# Patient Record
Sex: FEMALE | Race: WHITE | NOT HISPANIC OR LATINO | Employment: OTHER | URBAN - METROPOLITAN AREA
[De-identification: names, ages, dates, MRNs, and addresses within clinical notes are randomized per-mention and may not be internally consistent; named-entity substitution may affect disease eponyms.]

---

## 2023-02-17 ENCOUNTER — HOSPITAL ENCOUNTER (EMERGENCY)
Facility: HOSPITAL | Age: 68
Discharge: HOME | End: 2023-02-17
Attending: STUDENT IN AN ORGANIZED HEALTH CARE EDUCATION/TRAINING PROGRAM | Admitting: STUDENT IN AN ORGANIZED HEALTH CARE EDUCATION/TRAINING PROGRAM
Payer: COMMERCIAL

## 2023-02-17 VITALS
DIASTOLIC BLOOD PRESSURE: 82 MMHG | TEMPERATURE: 99.4 F | RESPIRATION RATE: 20 BRPM | HEART RATE: 95 BPM | WEIGHT: 206 LBS | OXYGEN SATURATION: 99 % | SYSTOLIC BLOOD PRESSURE: 159 MMHG

## 2023-02-17 DIAGNOSIS — H53.9 VISION CHANGES: ICD-10-CM

## 2023-02-17 LAB
ALBUMIN SERPL BCG-MCNC: 4.2 G/DL (ref 3.5–5.2)
ALP SERPL-CCNC: 264 U/L (ref 35–104)
ALT SERPL W P-5'-P-CCNC: 72 U/L (ref 10–35)
ANION GAP SERPL CALCULATED.3IONS-SCNC: 8 MMOL/L (ref 7–15)
AST SERPL W P-5'-P-CCNC: 61 U/L (ref 10–35)
BASOPHILS # BLD AUTO: 0.1 10E3/UL (ref 0–0.2)
BASOPHILS NFR BLD AUTO: 1 %
BILIRUB SERPL-MCNC: 0.4 MG/DL
BUN SERPL-MCNC: 10.4 MG/DL (ref 8–23)
CALCIUM SERPL-MCNC: 10 MG/DL (ref 8.8–10.2)
CHLORIDE SERPL-SCNC: 102 MMOL/L (ref 98–107)
CREAT SERPL-MCNC: 0.68 MG/DL (ref 0.51–0.95)
DEPRECATED HCO3 PLAS-SCNC: 28 MMOL/L (ref 22–29)
EOSINOPHIL # BLD AUTO: 0.1 10E3/UL (ref 0–0.7)
EOSINOPHIL NFR BLD AUTO: 2 %
ERYTHROCYTE [DISTWIDTH] IN BLOOD BY AUTOMATED COUNT: 13.4 % (ref 10–15)
GFR SERPL CREATININE-BSD FRML MDRD: >90 ML/MIN/1.73M2
GLUCOSE SERPL-MCNC: 114 MG/DL (ref 70–99)
HCT VFR BLD AUTO: 45.5 % (ref 35–47)
HGB BLD-MCNC: 14.5 G/DL (ref 11.7–15.7)
IMM GRANULOCYTES # BLD: 0 10E3/UL
IMM GRANULOCYTES NFR BLD: 0 %
LYMPHOCYTES # BLD AUTO: 1.3 10E3/UL (ref 0.8–5.3)
LYMPHOCYTES NFR BLD AUTO: 23 %
MAGNESIUM SERPL-MCNC: 2.4 MG/DL (ref 1.7–2.3)
MCH RBC QN AUTO: 28.7 PG (ref 26.5–33)
MCHC RBC AUTO-ENTMCNC: 31.9 G/DL (ref 31.5–36.5)
MCV RBC AUTO: 90 FL (ref 78–100)
MONOCYTES # BLD AUTO: 0.3 10E3/UL (ref 0–1.3)
MONOCYTES NFR BLD AUTO: 6 %
NEUTROPHILS # BLD AUTO: 3.7 10E3/UL (ref 1.6–8.3)
NEUTROPHILS NFR BLD AUTO: 68 %
NRBC # BLD AUTO: 0 10E3/UL
NRBC BLD AUTO-RTO: 0 /100
PLATELET # BLD AUTO: 247 10E3/UL (ref 150–450)
POTASSIUM SERPL-SCNC: 4.4 MMOL/L (ref 3.4–5.3)
PROT SERPL-MCNC: 7.7 G/DL (ref 6.4–8.3)
RBC # BLD AUTO: 5.06 10E6/UL (ref 3.8–5.2)
SODIUM SERPL-SCNC: 138 MMOL/L (ref 136–145)
TROPONIN T SERPL HS-MCNC: 7 NG/L
WBC # BLD AUTO: 5.4 10E3/UL (ref 4–11)

## 2023-02-17 PROCEDURE — 84484 ASSAY OF TROPONIN QUANT: CPT | Performed by: STUDENT IN AN ORGANIZED HEALTH CARE EDUCATION/TRAINING PROGRAM

## 2023-02-17 PROCEDURE — 93005 ELECTROCARDIOGRAM TRACING: CPT | Performed by: STUDENT IN AN ORGANIZED HEALTH CARE EDUCATION/TRAINING PROGRAM

## 2023-02-17 PROCEDURE — 36415 COLL VENOUS BLD VENIPUNCTURE: CPT | Performed by: STUDENT IN AN ORGANIZED HEALTH CARE EDUCATION/TRAINING PROGRAM

## 2023-02-17 PROCEDURE — 80053 COMPREHEN METABOLIC PANEL: CPT | Performed by: STUDENT IN AN ORGANIZED HEALTH CARE EDUCATION/TRAINING PROGRAM

## 2023-02-17 PROCEDURE — 85025 COMPLETE CBC W/AUTO DIFF WBC: CPT | Performed by: STUDENT IN AN ORGANIZED HEALTH CARE EDUCATION/TRAINING PROGRAM

## 2023-02-17 PROCEDURE — 99284 EMERGENCY DEPT VISIT MOD MDM: CPT | Mod: 25

## 2023-02-17 PROCEDURE — 83735 ASSAY OF MAGNESIUM: CPT | Performed by: STUDENT IN AN ORGANIZED HEALTH CARE EDUCATION/TRAINING PROGRAM

## 2023-02-17 ASSESSMENT — ACTIVITIES OF DAILY LIVING (ADL): ADLS_ACUITY_SCORE: 33

## 2023-02-17 NOTE — ED TRIAGE NOTES
C/o blurred vision to both eyes onset Wednesday afternoon. Patient also has felt lightheaded which is worse when she walks and her chest feels hot. Also concerned about her blood pressure. Denies any new symptoms today. Denies pain.

## 2023-02-17 NOTE — DISCHARGE INSTRUCTIONS
Your blood work today was reassuring.  There were no signs of heart attack.  Your liver enzymes are mildly elevated.  Please call your primary care doctor to get seen in the next week or so.  Return for any worsening symptoms.

## 2023-02-17 NOTE — ED PROVIDER NOTES
Emergency Department Encounter         FINAL IMPRESSION:  Vision changes        ED COURSE AND MEDICAL DECISION MAKING     9:17 AM I met the patient and performed my initial interview and exam.     ED Course as of 02/17/23 1305   Fri Feb 17, 2023   8508 Patient is an obese 68-year-old female here with blurry vision and intermittent weakness for the past couple days.  Patient states the blurry vision and double vision at times is intermittent.  Does not last very long.  There is no exertional component with it.  States at times she has difficulty reading her phone.  States that she did not know whether or not to come here or to the eye doctor today.  Was concerned about possible high blood pressure although she did not check her blood pressure at home.  Has not seen a doctor in years.  Denies any shortness of breath, chest pain, paresthesias, headache, neck pain, difficulty walking, dysarthria, weakness in upper or lower extremities,  dysuria or bowel movement changes.  States this morning she was cleaning when she had again episode of blurry vision and weakness that was quite ambiguous and spontaneously resolved.  No presyncope with these episodes.  No syncopal events.  No diaphoresis or nausea.  No chest pain.  Examination otherwise is normal here.  NIH of 0.  She is asymptomatic regarding her blurry vision stating that her vision is actually normal at this time.  States when she did look down my name tag to read I did at 1 point seem blurry however transient.     -Labs reassuring.  Troponin negative.  Mild elevation of LFTs however patient with no abdominal pain, nausea vomiting or significant symptoms suggesting obstructive pattern.  - EKG: Sinus rhythm 98, no signs of acute ischemia, no inversions no depressions no STEMI, QTc 449, no old EKGs for comparison.                   Medical Decision Making    History:    Supplemental history from: N/A    External Record(s) reviewed: Documented in chart, if  "applicable.    Work Up:    Chart documentation includes differential considered and any EKGs or imaging independently interpreted by provider, where specified.    In additional to work up documented, I considered the following work up: Documented in chart, if applicable.    External consultation:    Discussion of management with another provider: Documented in chart, if applicable    Complicating factors:    Care impacted by chronic illness: N/A    Care affected by social determinants of health: N/A    Disposition considerations: Discharge. No recommendations on prescription strength medication(s). See documentation for any additional details.              At the conclusion of the encounter I discussed the results of all the tests and the disposition. The questions were answered. The patient or family acknowledged understanding and was agreeable with the care plan.                  MEDICATIONS GIVEN IN THE EMERGENCY DEPARTMENT:  Medications - No data to display    NEW PRESCRIPTIONS STARTED AT TODAY'S ED VISIT:  There are no discharge medications for this patient.      HPI     Patient information obtained from: patient     Use of Interpretor: N/A     Lesly Ramirez is a 68 year old female with no pertinent history on file who presents to this ED by walk-in for evaluation of vision problem.    The patient reports intermittent blurred vision since Wednesday 2/15. States it comes on independent of her activity and resolves spontaneously quickly after coming on. She describes it as a \"fuzziness\" and patient also notes some occasional double vision. She has noticed the blurred vision mainly when she tries to focus on something for a while. She says she is having trouble seeing the numbers on her phone. Patient also notes some general weakness which resolves when she sits down/rests and intermittent sensation of warmth in her chest. She had a cold recently which is improving. The patient otherwise denies any headaches, " "numbness, tingling, lightheadedness, focal weakness, speech issues, appetite change, abdominal pain, diaphoresis, bowel/bladder changes, nausea, vomiting, difficulty swallowing, fever, cough, or shortness of breath. She expresses concern about her BP being high. The patient notes she has not seen a doctor in several years, though does have a PCP and an eye doctor. Patient denies additional medical concerns or complaints at this time.          REVIEW OF SYSTEMS:  Review of Systems   Constitutional: Negative for fever, malaise. Positive for general weakness.  HEENT: Negative runny nose, sore throat, ear pain, neck pain. Positive for blurred and double vision (intermittent).  Respiratory: Negative for shortness of breath, cough, congestion  Cardiovascular: Negative for chest pain, leg edema. Positive for chest \"warmth\" (intermittent).  Gastrointestinal: Negative for abdominal distention, abdominal pain, constipation, vomiting, nausea, diarrhea  Genitourinary: Negative for dysuria and hematuria.   Integument: Negative for rash, skin breakdown  Neurological: Negative for paresthesias, focal weakness, headache.  Musculoskeletal: Negative for joint pain, joint swelling      All other systems reviewed and are negative.          MEDICAL HISTORY     History reviewed. No pertinent past medical history.    History reviewed. No pertinent surgical history.         No current outpatient medications on file.          PHYSICAL EXAM     BP (!) 159/82   Pulse 95   Temp 99.4  F (37.4  C) (Temporal)   Resp 20   Wt 93.4 kg (206 lb)   SpO2 99%       PHYSICAL EXAM:     General: Patient appears well, nontoxic, comfortable  HEENT: Moist mucous membranes,  No head trauma.  No midline neck pain. She is asymptomatic regarding her blurry vision stating that her vision is actually normal at this time.  States when she did look down my name tag to read I did at 1 point seem blurry however transient.  Cardiovascular: Normal rate, normal " rhythm, no extremity edema.  No appreciable murmur.  Respiratory: No signs of respiratory distress, lungs are clear to auscultation bilaterally with no wheezes rhonchi or rales.  Abdominal: Soft, nontender, nondistended, no palpable masses, no guarding, no rebound  Musculoskeletal: Full range of motion of joints, no deformities appreciated.  Neurological: Alert and oriented, grossly neurologically intact. NIH 0.     Psychological: Normal affect and mood.  Integument: No rashes appreciated      RESULTS       Labs Ordered and Resulted from Time of ED Arrival to Time of ED Departure   COMPREHENSIVE METABOLIC PANEL - Abnormal       Result Value    Sodium 138      Potassium 4.4      Chloride 102      Carbon Dioxide (CO2) 28      Anion Gap 8      Urea Nitrogen 10.4      Creatinine 0.68      Calcium 10.0      Glucose 114 (*)     Alkaline Phosphatase 264 (*)     AST 61 (*)     ALT 72 (*)     Protein Total 7.7      Albumin 4.2      Bilirubin Total 0.4      GFR Estimate >90     MAGNESIUM - Abnormal    Magnesium 2.4 (*)    TROPONIN T, HIGH SENSITIVITY - Normal    Troponin T, High Sensitivity 7     CBC WITH PLATELETS AND DIFFERENTIAL    WBC Count 5.4      RBC Count 5.06      Hemoglobin 14.5      Hematocrit 45.5      MCV 90      MCH 28.7      MCHC 31.9      RDW 13.4      Platelet Count 247      % Neutrophils 68      % Lymphocytes 23      % Monocytes 6      % Eosinophils 2      % Basophils 1      % Immature Granulocytes 0      NRBCs per 100 WBC 0      Absolute Neutrophils 3.7      Absolute Lymphocytes 1.3      Absolute Monocytes 0.3      Absolute Eosinophils 0.1      Absolute Basophils 0.1      Absolute Immature Granulocytes 0.0      Absolute NRBCs 0.0         No orders to display                     PROCEDURES:  Procedures:  Procedures       Kayley CASE am serving as a scribe to document services personally performed by Reuben Mcpherson DO, based on my observations and the provider's statements to me.  IReuben DO, attest  that Kayley Rodriguez is acting in a scribe capacity, has observed my performance of the services and has documented them in accordance with my direction.    Reuben Mcpherson DO  Emergency Medicine  [facility] EMERGENCY DEPARTMENT     Ohl, Reuben Aragon DO  02/17/23 3151

## 2023-02-25 ENCOUNTER — NURSE TRIAGE (OUTPATIENT)
Dept: NURSING | Facility: CLINIC | Age: 68
End: 2023-02-25
Payer: COMMERCIAL

## 2023-02-25 ENCOUNTER — APPOINTMENT (OUTPATIENT)
Dept: CT IMAGING | Facility: HOSPITAL | Age: 68
End: 2023-02-25
Attending: EMERGENCY MEDICINE
Payer: COMMERCIAL

## 2023-02-25 ENCOUNTER — HOSPITAL ENCOUNTER (EMERGENCY)
Facility: HOSPITAL | Age: 68
Discharge: HOME | End: 2023-02-25
Attending: EMERGENCY MEDICINE | Admitting: EMERGENCY MEDICINE
Payer: COMMERCIAL

## 2023-02-25 VITALS
TEMPERATURE: 97.9 F | WEIGHT: 200 LBS | BODY MASS INDEX: 32.14 KG/M2 | SYSTOLIC BLOOD PRESSURE: 159 MMHG | RESPIRATION RATE: 16 BRPM | HEART RATE: 106 BPM | DIASTOLIC BLOOD PRESSURE: 72 MMHG | OXYGEN SATURATION: 98 % | HEIGHT: 66 IN

## 2023-02-25 DIAGNOSIS — R03.0 ELEVATED BLOOD PRESSURE READING WITHOUT DIAGNOSIS OF HYPERTENSION: ICD-10-CM

## 2023-02-25 DIAGNOSIS — R79.89 ELEVATED LFTS: ICD-10-CM

## 2023-02-25 LAB
ALBUMIN SERPL BCG-MCNC: 4.2 G/DL (ref 3.5–5.2)
ALP SERPL-CCNC: 276 U/L (ref 35–104)
ALT SERPL W P-5'-P-CCNC: 79 U/L (ref 10–35)
ANION GAP SERPL CALCULATED.3IONS-SCNC: 12 MMOL/L (ref 7–15)
AST SERPL W P-5'-P-CCNC: 67 U/L (ref 10–35)
BASOPHILS # BLD AUTO: 0.1 10E3/UL (ref 0–0.2)
BASOPHILS NFR BLD AUTO: 1 %
BILIRUB SERPL-MCNC: 0.4 MG/DL
BUN SERPL-MCNC: 11 MG/DL (ref 8–23)
CALCIUM SERPL-MCNC: 10 MG/DL (ref 8.8–10.2)
CHLORIDE SERPL-SCNC: 102 MMOL/L (ref 98–107)
CREAT SERPL-MCNC: 0.75 MG/DL (ref 0.51–0.95)
DEPRECATED HCO3 PLAS-SCNC: 26 MMOL/L (ref 22–29)
EOSINOPHIL # BLD AUTO: 0.1 10E3/UL (ref 0–0.7)
EOSINOPHIL NFR BLD AUTO: 2 %
ERYTHROCYTE [DISTWIDTH] IN BLOOD BY AUTOMATED COUNT: 13.1 % (ref 10–15)
GFR SERPL CREATININE-BSD FRML MDRD: 87 ML/MIN/1.73M2
GLUCOSE SERPL-MCNC: 152 MG/DL (ref 70–99)
HCT VFR BLD AUTO: 45.6 % (ref 35–47)
HGB BLD-MCNC: 15 G/DL (ref 11.7–15.7)
IMM GRANULOCYTES # BLD: 0 10E3/UL
IMM GRANULOCYTES NFR BLD: 0 %
LYMPHOCYTES # BLD AUTO: 1.1 10E3/UL (ref 0.8–5.3)
LYMPHOCYTES NFR BLD AUTO: 17 %
MCH RBC QN AUTO: 28.7 PG (ref 26.5–33)
MCHC RBC AUTO-ENTMCNC: 32.9 G/DL (ref 31.5–36.5)
MCV RBC AUTO: 87 FL (ref 78–100)
MONOCYTES # BLD AUTO: 0.5 10E3/UL (ref 0–1.3)
MONOCYTES NFR BLD AUTO: 7 %
NEUTROPHILS # BLD AUTO: 4.7 10E3/UL (ref 1.6–8.3)
NEUTROPHILS NFR BLD AUTO: 73 %
NRBC # BLD AUTO: 0 10E3/UL
NRBC BLD AUTO-RTO: 0 /100
PLATELET # BLD AUTO: 292 10E3/UL (ref 150–450)
POTASSIUM SERPL-SCNC: 3.9 MMOL/L (ref 3.4–5.3)
PROT SERPL-MCNC: 7.5 G/DL (ref 6.4–8.3)
RBC # BLD AUTO: 5.23 10E6/UL (ref 3.8–5.2)
SODIUM SERPL-SCNC: 140 MMOL/L (ref 136–145)
TSH SERPL DL<=0.005 MIU/L-ACNC: 1.38 UIU/ML (ref 0.3–4.2)
WBC # BLD AUTO: 6.4 10E3/UL (ref 4–11)

## 2023-02-25 PROCEDURE — 85025 COMPLETE CBC W/AUTO DIFF WBC: CPT | Performed by: EMERGENCY MEDICINE

## 2023-02-25 PROCEDURE — 70450 CT HEAD/BRAIN W/O DYE: CPT

## 2023-02-25 PROCEDURE — 80053 COMPREHEN METABOLIC PANEL: CPT | Performed by: EMERGENCY MEDICINE

## 2023-02-25 PROCEDURE — 84443 ASSAY THYROID STIM HORMONE: CPT | Performed by: EMERGENCY MEDICINE

## 2023-02-25 PROCEDURE — 36415 COLL VENOUS BLD VENIPUNCTURE: CPT | Performed by: EMERGENCY MEDICINE

## 2023-02-25 PROCEDURE — 93005 ELECTROCARDIOGRAM TRACING: CPT | Performed by: EMERGENCY MEDICINE

## 2023-02-25 PROCEDURE — 99285 EMERGENCY DEPT VISIT HI MDM: CPT | Mod: 25

## 2023-02-25 RX ORDER — LISINOPRIL 5 MG/1
5 TABLET ORAL DAILY
Qty: 30 TABLET | Refills: 0 | Status: SHIPPED | OUTPATIENT
Start: 2023-02-25

## 2023-02-25 ASSESSMENT — ENCOUNTER SYMPTOMS
COUGH: 0
SHORTNESS OF BREATH: 0
NAUSEA: 0
FATIGUE: 0
CHILLS: 0
FEVER: 0
VOMITING: 0
DYSURIA: 0
LIGHT-HEADEDNESS: 1
HEADACHES: 0
WEAKNESS: 0
ABDOMINAL PAIN: 0

## 2023-02-25 ASSESSMENT — ACTIVITIES OF DAILY LIVING (ADL): ADLS_ACUITY_SCORE: 35

## 2023-02-25 NOTE — ED PROVIDER NOTES
EMERGENCY DEPARTMENT ENCOUNTER      NAME: Lesly Ramirez  AGE: 68 year old female  YOB: 1955  MRN: [id number]  EVALUATION DATE & TIME: 2/25/2023 10:29 AM    PCP: No Ref-Primary, Physician    ED PROVIDER: Marcie Mcpherson DO      Chief Complaint   Patient presents with     Hypertension     Fatigue         FINAL IMPRESSION:  1. Elevated blood pressure reading without diagnosis of hypertension    2. Elevated LFTs          ED COURSE & MEDICAL DECISION MAKING:    Pertinent Labs & Imaging studies reviewed. (See chart for details)  10:52 AM I met the patient and performed my initial interview and exam.  11:57 AM I rechecked and updated the patient. Patient ready for discharge.     68 year old female presents to the Emergency Department for evaluation of fatigue, lightheadedness, elevated blood pressure.  Patient notes a couple weeks of intermittent blurred vision.  Seen here around a week ago, testing unremarkable.  See by her eye doctor and thought to be weakness of her extraocular muscles and referred to a specialist.  Checking BP at home and in the 180s today.  Always above 140.  No chest pain or SOB.  No acute changes in vision.  Feels light headed at times.  Exam benign.  No focal neurologic deficits.  Head CT without mass, bleed or signs of prior stroke.  Labs reassuring, mild elevation in LFTs similar to prior, no RUQ pain to suggest cholelithiasis/choledocholithiasis.  EKG shows likely LVH.  Discussed with patient.  She needs to establish care with a PCP.  I will start her on a low dose ACE-I.  Encouraged follow up with ophthalmology as scheduled.  Return if any acute worsening of condition.      At the conclusion of the encounter I discussed the results of all of the tests and the disposition. The questions were answered. The patient or family acknowledged understanding and was agreeable with the care plan.     Medical Decision Making    History:    Supplemental history from: Documented in chart, if  applicable    External Record(s) reviewed: Documented in chart, if applicable.    Work Up:    Chart documentation includes differential considered and any EKGs or imaging independently interpreted by provider, where specified.    In additional to work up documented, I considered the following work up: Documented in chart, if applicable.    External consultation:    Discussion of management with another provider: Documented in chart, if applicable    Complicating factors:    Care impacted by chronic illness: N/A    Care affected by social determinants of health: N/A    Disposition considerations: Discharge. I prescribed additional prescription strength medication(s) as charted. See documentation for any additional details.      MEDICATIONS GIVEN IN THE EMERGENCY:  Medications - No data to display    NEW PRESCRIPTIONS STARTED AT TODAY'S ER VISIT  Discharge Medication List as of 2/25/2023 12:36 PM      START taking these medications    Details   lisinopril (ZESTRIL) 5 MG tablet Take 1 tablet (5 mg) by mouth daily, Disp-30 tablet, R-0, E-Prescribe                =================================================================    HPI    Patient information was obtained from: Patient    Use of : N/A         Leslysarmad Ramirez is a 68 year old female with no pertinent history who presents to this ED via walk-in for evaluation of hypertension and fatigue.    Patient reports she was here a week ago for an eye problem. She has since been seen by an ophthalmologist who said she had weak eye muscles. Patient reports she presents again today because her blood pressure is elevated, noting that it has been going up and down. She checks her blood pressure at home 2-3x per day. She reports she was sitting and relaxing when she checked it today. She notes feeling woozy and lightheaded. No chest pain, nausea, or vomiting. No fever. She does not take anything for her blood pressure. No other current complaints.    Per chart  "review: Patient was seen here on 2/17/2023 for blurry vision and double vision intermittently. She was concerned about her blood pressure. NIH of 0. EKG was normal.    REVIEW OF SYSTEMS   Review of Systems   Constitutional: Negative for chills, fatigue and fever.   Respiratory: Negative for cough and shortness of breath.    Cardiovascular: Negative for chest pain.   Gastrointestinal: Negative for abdominal pain, nausea and vomiting.   Genitourinary: Negative for dysuria.   Skin: Negative.    Neurological: Positive for light-headedness. Negative for syncope, weakness and headaches.        PAST MEDICAL HISTORY:  No past medical history on file.    PAST SURGICAL HISTORY:  No past surgical history on file.        CURRENT MEDICATIONS:    lisinopril (ZESTRIL) 5 MG tablet         ALLERGIES:  No Known Allergies    FAMILY HISTORY:  No family history on file.    SOCIAL HISTORY:   Social History     Socioeconomic History     Marital status:        VITALS:  BP (!) 159/72   Pulse 106   Temp 97.9  F (36.6  C) (Oral)   Resp 16   Ht 1.676 m (5' 6\")   Wt 90.7 kg (200 lb)   SpO2 98%   BMI 32.28 kg/m      PHYSICAL EXAM    Physical Exam  Constitutional:       General: She is not in acute distress.  HENT:      Head: Normocephalic and atraumatic.      Mouth/Throat:      Pharynx: Oropharynx is clear.   Eyes:      Pupils: Pupils are equal, round, and reactive to light.   Cardiovascular:      Rate and Rhythm: Normal rate and regular rhythm.      Pulses: Normal pulses.      Heart sounds: Normal heart sounds.   Pulmonary:      Effort: Pulmonary effort is normal.      Breath sounds: Normal breath sounds.   Abdominal:      General: Abdomen is flat. Bowel sounds are normal.      Palpations: Abdomen is soft.      Tenderness: There is no abdominal tenderness.   Musculoskeletal:         General: Normal range of motion.   Skin:     General: Skin is warm and dry.      Capillary Refill: Capillary refill takes less than 2 seconds. "   Neurological:      General: No focal deficit present.      Mental Status: She is alert and oriented to person, place, and time.      Cranial Nerves: Cranial nerves 2-12 are intact.      Sensory: Sensation is intact.      Motor: Motor function is intact.      Coordination: Finger-Nose-Finger Test normal.          LAB:  All pertinent labs reviewed and interpreted.  Labs Ordered and Resulted from Time of ED Arrival to Time of ED Departure   COMPREHENSIVE METABOLIC PANEL - Abnormal       Result Value    Sodium 140      Potassium 3.9      Chloride 102      Carbon Dioxide (CO2) 26      Anion Gap 12      Urea Nitrogen 11.0      Creatinine 0.75      Calcium 10.0      Glucose 152 (*)     Alkaline Phosphatase 276 (*)     AST 67 (*)     ALT 79 (*)     Protein Total 7.5      Albumin 4.2      Bilirubin Total 0.4      GFR Estimate 87     CBC WITH PLATELETS AND DIFFERENTIAL - Abnormal    WBC Count 6.4      RBC Count 5.23 (*)     Hemoglobin 15.0      Hematocrit 45.6      MCV 87      MCH 28.7      MCHC 32.9      RDW 13.1      Platelet Count 292      % Neutrophils 73      % Lymphocytes 17      % Monocytes 7      % Eosinophils 2      % Basophils 1      % Immature Granulocytes 0      NRBCs per 100 WBC 0      Absolute Neutrophils 4.7      Absolute Lymphocytes 1.1      Absolute Monocytes 0.5      Absolute Eosinophils 0.1      Absolute Basophils 0.1      Absolute Immature Granulocytes 0.0      Absolute NRBCs 0.0     TSH WITH FREE T4 REFLEX - Normal    TSH 1.38         RADIOLOGY:  Reviewed all pertinent imaging. Please see official radiology report.  Head CT w/o contrast   Final Result   IMPRESSION:   1.  No acute intracranial abnormality.             EKG:    Performed at: 11:01    Impression: normal sinus rhythm. Left axis deviation. Septal infarct.    Rate: 98 bpm  Rhythm: sinus rhythm  Axis: 35  OK Interval: 194 ms  QRS Interval: 92 ms  QTc Interval: 464 ms  ST Changes: none  Comparison: No significant change from 17-FEB-2023.    I  have independently reviewed and interpreted the EKG(s) documented above.      I, Lisa Crooks, am serving as a scribe to document services personally performed by Dr. Marcie Mcpherson based on my observation and the provider's statements to me. I, Marcie Mcpherson, DO attest that Lisa Crooks is acting in a scribe capacity, has observed my performance of the services and has documented them in accordance with my direction.    Marcie Mcpherson DO  Emergency Medicine  [facility] EMERGENCY DEPARTMENT  [address] [place] [id number]  [phone]  Dept: [phone]     Marcie Mcpherson DO  02/25/23 2005

## 2023-02-25 NOTE — TELEPHONE ENCOUNTER
Pt is phoning stating that her blood pressure is 188/96    Pt is feeling weak and lighted     Per disposition: Go to ED Now    Care advice given per protocol and when to call back. Pt verbalized understanding and agrees to plan of care.    Arcelia Steinberg RN  [place] Nurse Advisor  10:00 AM 2/25/2023        Reason for Disposition    [1] Systolic BP  >= 160 OR Diastolic >= 100 AND [2] cardiac or neurologic symptoms (e.g., chest pain, difficulty breathing, unsteady gait, blurred vision)    Additional Information    Negative: Difficult to awaken or acting confused (e.g., disoriented, slurred speech)    Negative: SEVERE difficulty breathing (e.g., struggling for each breath, speaks in single words)    Negative: [1] Weakness of the face, arm or leg on one side of the body AND [2] new-onset    Negative: [1] Numbness (i.e., loss of sensation) of the face, arm or leg on one side of the body AND [2] new-onset    Negative: [1] Chest pain lasts > 5 minutes AND [2] history of heart disease (i.e., heart attack, bypass surgery, angina, angioplasty, CHF)    Negative: [1] Chest pain AND [2] took nitrogylcerin AND [3] pain was not relieved    Negative: Sounds like a life-threatening emergency to the triager    Negative: Symptom is main concern (e.g., headache, chest pain)    Negative: Low blood pressure is main concern    Protocols used: BLOOD PRESSURE - HIGH-A-

## 2023-02-25 NOTE — ED NOTES
ER DISCHARGE NOTE:   Lesly Ramirez MRN: [id number]      Lesly Ramirez is discharged from the emergency room.    (R03.0) Elevated blood pressure reading without diagnosis of hypertension  Comment:   Plan: Primary Care Referral            (R79.89) Elevated LFTs  Comment:   Plan: Primary Care Referral                Lesly Ramirez discharged via self with spouse.    Verbalized understanding of discharge instructions.   Prescriptions: e-faxed.    AVS in hand.

## 2023-02-25 NOTE — DISCHARGE INSTRUCTIONS
As discussed, your testing is reassuring  Your head CT does not show any signs of any bleeding or masses  Given your high blood pressure readings, I will start you on a medication called lisinopril.  We will start you on a low dose and you will need close follow up to recheck and see if you need any medication changes or refills  We will start you at a low dose, please follow-up closely with the primary care provider within a week for recheck  As discussed your liver function tests are slightly elevated but nothing too dangerous, I would avoid alcohol or extra Tylenol and follow-up closely with your primary doctor  Follow-up closely with the eye doctor as scheduled, return if any acute worsening symptoms

## 2023-02-25 NOTE — ED TRIAGE NOTES
"Patient c/o high blood pressure \" did home BP check \" 's; denied chest pain.  Patient c/o fatigue.  Stated has appointment for eye doctor next week for vision problem.       "

## 2023-04-03 ENCOUNTER — LAB REQUISITION (OUTPATIENT)
Dept: LAB | Facility: CLINIC | Age: 68
End: 2023-04-03

## 2023-04-03 DIAGNOSIS — R73.9 HYPERGLYCEMIA, UNSPECIFIED: ICD-10-CM

## 2023-04-03 DIAGNOSIS — E78.2 MIXED HYPERLIPIDEMIA: ICD-10-CM

## 2023-04-03 DIAGNOSIS — H53.2 DIPLOPIA: ICD-10-CM

## 2023-04-03 LAB
ALBUMIN SERPL BCG-MCNC: 4.3 G/DL (ref 3.5–5.2)
ALP SERPL-CCNC: 309 U/L (ref 35–104)
ALT SERPL W P-5'-P-CCNC: 117 U/L (ref 10–35)
AST SERPL W P-5'-P-CCNC: 79 U/L (ref 10–35)
BILIRUB DIRECT SERPL-MCNC: <0.2 MG/DL (ref 0–0.3)
BILIRUB SERPL-MCNC: 0.4 MG/DL
CHOLEST SERPL-MCNC: 267 MG/DL
HBA1C MFR BLD: 6.3 %
HDLC SERPL-MCNC: 135 MG/DL
LDLC SERPL CALC-MCNC: 117 MG/DL
NONHDLC SERPL-MCNC: 132 MG/DL
PROT SERPL-MCNC: 7 G/DL (ref 6.4–8.3)
TRIGL SERPL-MCNC: 74 MG/DL
VIT B12 SERPL-MCNC: 588 PG/ML (ref 232–1245)

## 2023-04-03 PROCEDURE — 83519 RIA NONANTIBODY: CPT | Performed by: FAMILY MEDICINE

## 2023-04-03 PROCEDURE — 80076 HEPATIC FUNCTION PANEL: CPT | Performed by: FAMILY MEDICINE

## 2023-04-03 PROCEDURE — 80061 LIPID PANEL: CPT | Performed by: FAMILY MEDICINE

## 2023-04-03 PROCEDURE — 86255 FLUORESCENT ANTIBODY SCREEN: CPT | Performed by: FAMILY MEDICINE

## 2023-04-03 PROCEDURE — 82607 VITAMIN B-12: CPT | Performed by: FAMILY MEDICINE

## 2023-04-03 PROCEDURE — 83036 HEMOGLOBIN GLYCOSYLATED A1C: CPT | Performed by: FAMILY MEDICINE

## 2023-04-19 LAB
ACHR BIND IGG+IGM SER IA-SCNC: 0.11 NMOL/L
ACHR MODULATING FLOW CYTOMETRY, S: NEGATIVE
IMMUNOLOGIST REVIEW: ABNORMAL

## 2023-11-06 ENCOUNTER — LAB REQUISITION (OUTPATIENT)
Dept: LAB | Facility: CLINIC | Age: 68
End: 2023-11-06

## 2023-11-06 DIAGNOSIS — E78.2 MIXED HYPERLIPIDEMIA: ICD-10-CM

## 2023-11-06 DIAGNOSIS — M81.0 AGE-RELATED OSTEOPOROSIS WITHOUT CURRENT PATHOLOGICAL FRACTURE: ICD-10-CM

## 2023-11-06 PROCEDURE — 84155 ASSAY OF PROTEIN SERUM: CPT | Performed by: FAMILY MEDICINE

## 2023-11-06 PROCEDURE — 80061 LIPID PANEL: CPT | Performed by: FAMILY MEDICINE

## 2023-11-06 PROCEDURE — 80053 COMPREHEN METABOLIC PANEL: CPT | Performed by: FAMILY MEDICINE

## 2023-11-06 PROCEDURE — 82306 VITAMIN D 25 HYDROXY: CPT | Performed by: FAMILY MEDICINE

## 2023-11-06 PROCEDURE — 82947 ASSAY GLUCOSE BLOOD QUANT: CPT | Performed by: FAMILY MEDICINE

## 2023-11-07 LAB
ALBUMIN SERPL BCG-MCNC: 4.1 G/DL (ref 3.5–5.2)
ALP SERPL-CCNC: 333 U/L (ref 35–104)
ALT SERPL W P-5'-P-CCNC: 107 U/L (ref 0–50)
ANION GAP SERPL CALCULATED.3IONS-SCNC: 13 MMOL/L (ref 7–15)
AST SERPL W P-5'-P-CCNC: 82 U/L (ref 0–45)
BILIRUB SERPL-MCNC: 0.5 MG/DL
BUN SERPL-MCNC: 19.5 MG/DL (ref 8–23)
CALCIUM SERPL-MCNC: 9.6 MG/DL (ref 8.8–10.2)
CHLORIDE SERPL-SCNC: 99 MMOL/L (ref 98–107)
CHOLEST SERPL-MCNC: 202 MG/DL
CREAT SERPL-MCNC: 0.69 MG/DL (ref 0.51–0.95)
DEPRECATED HCO3 PLAS-SCNC: 27 MMOL/L (ref 22–29)
EGFRCR SERPLBLD CKD-EPI 2021: >90 ML/MIN/1.73M2
GLUCOSE SERPL-MCNC: 100 MG/DL (ref 70–99)
HDLC SERPL-MCNC: 123 MG/DL
LDLC SERPL CALC-MCNC: 68 MG/DL
NONHDLC SERPL-MCNC: 79 MG/DL
POTASSIUM SERPL-SCNC: 3.7 MMOL/L (ref 3.4–5.3)
PROT SERPL-MCNC: 6.8 G/DL (ref 6.4–8.3)
SODIUM SERPL-SCNC: 139 MMOL/L (ref 135–145)
TRIGL SERPL-MCNC: 55 MG/DL
VIT D+METAB SERPL-MCNC: 50 NG/ML (ref 20–50)

## 2024-06-21 ENCOUNTER — LAB REQUISITION (OUTPATIENT)
Dept: LAB | Facility: CLINIC | Age: 69
End: 2024-06-21

## 2024-06-21 DIAGNOSIS — E78.2 MIXED HYPERLIPIDEMIA: ICD-10-CM

## 2024-06-21 DIAGNOSIS — K76.0 FATTY (CHANGE OF) LIVER, NOT ELSEWHERE CLASSIFIED: ICD-10-CM

## 2024-06-21 LAB
ALBUMIN SERPL BCG-MCNC: 4.1 G/DL (ref 3.5–5.2)
ALP SERPL-CCNC: 295 U/L (ref 40–150)
ALT SERPL W P-5'-P-CCNC: 76 U/L (ref 0–50)
AST SERPL W P-5'-P-CCNC: 67 U/L (ref 0–45)
BILIRUB DIRECT SERPL-MCNC: 0.21 MG/DL (ref 0–0.3)
BILIRUB SERPL-MCNC: 0.5 MG/DL
ERYTHROCYTE [DISTWIDTH] IN BLOOD BY AUTOMATED COUNT: 13.5 % (ref 10–15)
HCT VFR BLD AUTO: 41.7 % (ref 35–47)
HGB BLD-MCNC: 13.4 G/DL (ref 11.7–15.7)
MCH RBC QN AUTO: 30 PG (ref 26.5–33)
MCHC RBC AUTO-ENTMCNC: 32.1 G/DL (ref 31.5–36.5)
MCV RBC AUTO: 94 FL (ref 78–100)
PLATELET # BLD AUTO: 180 10E3/UL (ref 150–450)
PROT SERPL-MCNC: 6.9 G/DL (ref 6.4–8.3)
RBC # BLD AUTO: 4.46 10E6/UL (ref 3.8–5.2)
WBC # BLD AUTO: 5.2 10E3/UL (ref 4–11)

## 2024-06-21 PROCEDURE — 85027 COMPLETE CBC AUTOMATED: CPT | Performed by: FAMILY MEDICINE

## 2024-06-21 PROCEDURE — 84443 ASSAY THYROID STIM HORMONE: CPT | Performed by: FAMILY MEDICINE

## 2024-06-21 PROCEDURE — 80076 HEPATIC FUNCTION PANEL: CPT | Performed by: FAMILY MEDICINE

## 2024-06-22 LAB — TSH SERPL DL<=0.005 MIU/L-ACNC: 1.98 UIU/ML (ref 0.3–4.2)
